# Patient Record
Sex: FEMALE | URBAN - METROPOLITAN AREA
[De-identification: names, ages, dates, MRNs, and addresses within clinical notes are randomized per-mention and may not be internally consistent; named-entity substitution may affect disease eponyms.]

---

## 2019-02-18 ENCOUNTER — IMPORTED ENCOUNTER (OUTPATIENT)
Dept: URBAN - METROPOLITAN AREA CLINIC 9 | Facility: CLINIC | Age: 64
End: 2019-02-18

## 2021-10-16 ASSESSMENT — KERATOMETRY
OD_K1POWER_DIOPTERS: 43
OS_K2POWER_DIOPTERS: 42.75
OD_K2POWER_DIOPTERS: 43
OS_K1POWER_DIOPTERS: 42.5
OS_AXISANGLE2_DEGREES: 126
OD_AXISANGLE_DEGREES: 180
OS_AXISANGLE_DEGREES: 36
OD_AXISANGLE2_DEGREES: 90

## 2021-10-16 ASSESSMENT — VISUAL ACUITY
OS_CC: 20/20 SN
OS_CC: 20/25 SN
OD_CC: 20/25 SN
OD_CC: 20/25 + SN

## 2021-10-16 ASSESSMENT — TONOMETRY
OD_IOP_MMHG: 12
OS_IOP_MMHG: 12
